# Patient Record
Sex: MALE | Race: WHITE | Employment: UNEMPLOYED | URBAN - METROPOLITAN AREA
[De-identification: names, ages, dates, MRNs, and addresses within clinical notes are randomized per-mention and may not be internally consistent; named-entity substitution may affect disease eponyms.]

---

## 2022-11-30 DIAGNOSIS — G25.81 RLS (RESTLESS LEGS SYNDROME): ICD-10-CM

## 2022-11-30 PROBLEM — F41.1 GAD (GENERALIZED ANXIETY DISORDER): Chronic | Status: ACTIVE | Noted: 2022-11-30

## 2022-11-30 PROBLEM — J30.9 ALLERGIC RHINITIS: Status: ACTIVE | Noted: 2022-11-30

## 2022-11-30 PROBLEM — F41.1 GAD (GENERALIZED ANXIETY DISORDER): Status: ACTIVE | Noted: 2022-11-30

## 2022-11-30 LAB
ALBUMIN SERPL-MCNC: 4.4 G/DL (ref 3.4–5)
ANION GAP SERPL CALCULATED.3IONS-SCNC: 12 MMOL/L (ref 3–16)
BUN BLDV-MCNC: 19 MG/DL (ref 7–20)
CALCIUM SERPL-MCNC: 9.8 MG/DL (ref 8.3–10.6)
CHLORIDE BLD-SCNC: 100 MMOL/L (ref 99–110)
CO2: 26 MMOL/L (ref 21–32)
CREAT SERPL-MCNC: 1.1 MG/DL (ref 0.8–1.3)
FERRITIN: 284.1 NG/ML (ref 30–400)
GFR SERPL CREATININE-BSD FRML MDRD: >60 ML/MIN/{1.73_M2}
GLUCOSE BLD-MCNC: 87 MG/DL (ref 70–99)
IRON SATURATION: 30 % (ref 20–50)
IRON: 82 UG/DL (ref 59–158)
PHOSPHORUS: 4.2 MG/DL (ref 2.5–4.9)
POTASSIUM SERPL-SCNC: 4.7 MMOL/L (ref 3.5–5.1)
SODIUM BLD-SCNC: 138 MMOL/L (ref 136–145)
TOTAL IRON BINDING CAPACITY: 270 UG/DL (ref 260–445)
TSH REFLEX: 2.14 UIU/ML (ref 0.27–4.2)

## 2023-01-09 ENCOUNTER — TELEPHONE (OUTPATIENT)
Dept: PULMONOLOGY | Age: 68
End: 2023-01-09

## 2023-01-09 NOTE — PROGRESS NOTES
Vinay Andrea         : 1955  [facility]    Diagnosis: [x] ROOSEVELT (G47.33) [] CSA (G47.31) [] Apnea (G47.30)   Length of Need: [x] 18 Months [] 99 Months [] Other:    Machine (HARDY!): [] Respironics Dream Station   2   Auto [x] ResMed AirSense     Auto S11 [] Other:     [x]  CPAP () [] Bilevel ()   Mode: [x] Auto [] Spontaneous    Mode: [] Auto [] Spontaneous      P min 6 cmH2O  P max 16 cmH2O      Comfort Settings:   - Ramp Pressure: 4 cmH2O                                        - Ramp time: 15 min                                     -  Flex/EPR - 3 full time                                    - For ResMed Bilevel (TiMax-4 sec   TiMin- 0.2 sec)     Humidifier: [x] Heated ()        [x] Water chamber replacement ()/ 1 per 6 months        Mask:   [x] Nasal () /1 per 3 months [] Full Face () /1 per 3 months   [x] Patient choice -Size and fit mask [] Patient Choice - Size and fit mask   [] Dispense:  [] Dispense:    [x] Headgear () / 1 per 3 months [] Headgear () / 1 per 3 months   [x] Replacement Nasal Cushion ()/2 per month [] Interface Replacement ()/1 per month   [] Replacement Nasal Pillows ()/2 per month         Tubing: [x] Heated ()/1 per 3 months    [] Standard ()/1 per 3 months [] Other:           Filters: [x] Non-disposable ()/1 per 6 months     [x] Ultra-Fine, Disposable ()/2 per month        Miscellaneous: [] Chin Strap ()/ 1 per 6 months [] O2 bleed-in:       LPM   [] Oximetry on CPAP/Bilevel []  Other:    [x] Modem: ()         Start Order Date: 23    MEDICAL JUSTIFICATION:  I, the undersigned, certify that the above prescribed supplies are medically necessary for this patients wellbeing. In my opinion, the supplies are both reasonable and necessary in reference to accepted standards of medicalpractice in treatment of this patients condition.     Faith Shultz MD      NPI: [id number]       Order Signed Date: 23    Electronically signed by Renuka Mckeon MD on 2023 at 4:05 PM    Lashae Vaughan  1955  [facility]  [phone] (home)   [phone] (mobile)      Insurance Info (confirm with patient if correct):  Payer/Plan Subscr  Sex Relation Sub.  Ins. ID Effective Group Num

## 2023-01-09 NOTE — TELEPHONE ENCOUNTER
Spoke with pt to review HST results. Pt  apologizing  for the difficulty reaching him. Pt is familiar with ROOSEVELT and states spouse is using cpap. Pt asking appropriate questions. Order to be sent to  [facility]. F/U scheduled.

## 2023-01-24 NOTE — PROGRESS NOTES
Hector Chaves         : 1955    Diagnosis: [x] ROOSEVELT (G47.33) [] CSA (G47.31) [] Apnea (G47.30)   Length of Need: [x] 18 Months [] 99 Months [] Other:    Machine (HARDY!): [] Respironics Dream Station   2   Auto [x] ResMed AirSense     Auto S11 [] Other:     [x]  CPAP () [] Bilevel ()   Mode: [x] Auto [] Spontaneous    Mode: [] Auto [] Spontaneous      P min 6 cmH2O  P max 16 cmH2O      Comfort Settings:   - Ramp Pressure: 4 cmH2O                                        - Ramp time: 15 min                                     -  Flex/EPR - 3 full time                                    - For ResMed Bilevel (TiMax-4 sec   TiMin- 0.2 sec)     Humidifier: [x] Heated ()        [x] Water chamber replacement ()/ 1 per 6 months        Mask:   [x] Nasal () /1 per 3 months [] Full Face () /1 per 3 months   [x] Patient choice -Size and fit mask [] Patient Choice - Size and fit mask   [] Dispense:  [] Dispense:    [x] Headgear () / 1 per 3 months [] Headgear () / 1 per 3 months   [x] Replacement Nasal Cushion ()/2 per month [] Interface Replacement ()/1 per month   [] Replacement Nasal Pillows ()/2 per month         Tubing: [x] Heated ()/1 per 3 months    [] Standard ()/1 per 3 months [] Other:           Filters: [x] Non-disposable ()/1 per 6 months     [x] Ultra-Fine, Disposable ()/2 per month        Miscellaneous: [] Chin Strap ()/ 1 per 6 months [] O2 bleed-in:       LPM   [] Oximetry on CPAP/Bilevel []  Other:    [x] Modem: ()         Start Order Date: 23    MEDICAL JUSTIFICATION:  I, the undersigned, certify that the above prescribed supplies are medically necessary for this patients wellbeing. In my opinion, the supplies are both reasonable and necessary in reference to accepted standards of medicalpractice in treatment of this patients condition.     Woody Whitten MD      NPI: [id number]       Order Signed Date: 23    Electronically signed by Nga Reyna MD on 2023 at 12:52 PM    Chalino Gamble  1955  [place] [address]  [phone] (home) [phone] (work)  [phone] (mobile)      Insurance Info (confirm with patient if correct):  Payer/Plan Subscr  Sex Relation Sub.  Ins. ID Effective Group Num

## 2023-01-31 ENCOUNTER — TELEPHONE (OUTPATIENT)
Dept: PULMONOLOGY | Age: 68
End: 2023-01-31

## 2023-01-31 NOTE — TELEPHONE ENCOUNTER
Spoke with pt to let him know order was faxed and confirmation in chart. Pt was given the number to contact [facility] if needed.

## 2023-04-17 ENCOUNTER — TELEPHONE (OUTPATIENT)
Dept: PULMONOLOGY | Age: 68
End: 2023-04-17

## 2023-04-17 NOTE — TELEPHONE ENCOUNTER
Spoke to pt advised he needs a 31-90 day compliance & his appt is too soon. Advised Rosemarie we need to get him in she will be giving him a call to schedule.

## 2023-05-18 PROBLEM — G47.33 OBSTRUCTIVE SLEEP APNEA SYNDROME: Status: ACTIVE | Noted: 2023-05-18

## 2023-05-19 PROBLEM — G25.81 RLS (RESTLESS LEGS SYNDROME): Chronic | Status: ACTIVE | Noted: 2022-11-30

## 2023-05-23 ENCOUNTER — TELEPHONE (OUTPATIENT)
Dept: PULMONOLOGY | Age: 68
End: 2023-05-23

## 2023-05-23 NOTE — TELEPHONE ENCOUNTER
Pt called in wanting to cancel CPAP titration, pt would like to reschedule but with a different healthcare network due to his insurance and out-of-pocket cost for the study. Pt would like to go to [facility]. I explained to pt that I was unsure if Dr Ricarda Godfrey would be able to send order for study to a hospital outside of [facility] ([facility]), but I would ask. Pt requesting call back with answer at [phone]. Pt wants to keep titration study on the schedule until Dr Ricarda Godfrey advises.

## 2023-05-23 NOTE — TELEPHONE ENCOUNTER
Spoke with pt to let him know. He can have a copy of his script however he may not be able to find a lab that will use it. Pt to call back with his decision.

## 2023-05-26 ENCOUNTER — TELEPHONE (OUTPATIENT)
Dept: PULMONOLOGY | Age: 68
End: 2023-05-26

## 2023-05-26 NOTE — TELEPHONE ENCOUNTER
Pt called wanting to ensure he receives a size medium mask. Pt to call his equipment company  to discuss.

## 2023-09-13 ENCOUNTER — TELEPHONE (OUTPATIENT)
Dept: PULMONOLOGY | Age: 68
End: 2023-09-13

## 2023-09-13 NOTE — TELEPHONE ENCOUNTER
Pt LM stating he was switching to a different sleep doctor for insurance purposes. Pt will be seeing Brayan Mcintosh with [facility]. Requesting we send results of diagnostic sleep study. Will send HST from 12/2022. I LM for Dr. Vale Montalvo office requesting they call back and let me know their fax number so I can send results. LM informing pt I would send as soon as I obtained fax.